# Patient Record
Sex: FEMALE | Race: WHITE | NOT HISPANIC OR LATINO | URBAN - METROPOLITAN AREA
[De-identification: names, ages, dates, MRNs, and addresses within clinical notes are randomized per-mention and may not be internally consistent; named-entity substitution may affect disease eponyms.]

---

## 2021-01-01 ENCOUNTER — OFFICE VISIT (OUTPATIENT)
Dept: URBAN - METROPOLITAN AREA CLINIC 68 | Facility: CLINIC | Age: 71
End: 2021-01-01

## 2021-03-09 ENCOUNTER — OFFICE VISIT (OUTPATIENT)
Dept: URBAN - METROPOLITAN AREA CLINIC 68 | Facility: CLINIC | Age: 71
End: 2021-03-09

## 2021-10-14 NOTE — PATIENT DISCUSSION
Patient informed on CV or Advanced IOL but she would need to have LASIK OD to improve distance vision OD.

## 2021-10-19 NOTE — PATIENT DISCUSSION
If pt choose to [facility] rec. Epi-Sonu PLATA with [facility]. Monitor.

## 2021-10-19 NOTE — PATIENT DISCUSSION
Rec. NV vs DV (CV/B), vs Mono VA OS dist. , vs Adv. BONI OD. IF needed future correction Epi-Lasek tx. The patient feels that the cataract is significantly impacting daily activities and has elected cataract surgery. The risks, benefits, and alternatives to surgery were discussed. The patient elects to proceed with surgery.

## 2021-12-02 NOTE — PATIENT DISCUSSION
If pt choose to [facility] rec. Epi-Lasek BONI with [facility]. Monitor.

## 2022-06-04 ENCOUNTER — TELEPHONE ENCOUNTER (OUTPATIENT)
Dept: URBAN - METROPOLITAN AREA CLINIC 68 | Facility: CLINIC | Age: 72
End: 2022-06-04

## 2022-06-04 RX ORDER — POLYETHYLENE GLYCOL 3350 17 G/DOSE
POWDER (GRAM) ORAL
Qty: 1 | Refills: 0 | OUTPATIENT
Start: 2021-03-09 | End: 2021-04-08

## 2022-06-05 ENCOUNTER — TELEPHONE ENCOUNTER (OUTPATIENT)
Dept: URBAN - METROPOLITAN AREA CLINIC 68 | Facility: CLINIC | Age: 72
End: 2022-06-05

## 2022-06-05 RX ORDER — LEVOTHYROXINE SODIUM 112 UG/1
SYNTHROID( 112MCG ORAL   ) ACTIVE -HX ENTRY TABLET ORAL
Status: ACTIVE | COMMUNITY
Start: 2021-03-09

## 2022-06-05 RX ORDER — SULFADIAZINE 500 MG/1
SULFADIAZINE( 500MG ORAL   ) ACTIVE -HX ENTRY TABLET ORAL
Status: ACTIVE | COMMUNITY
Start: 2021-03-09

## 2022-06-05 RX ORDER — HYDROXYCHLOROQUINE SULFATE 200 MG/1
PLAQUENIL( 200MG ORAL   ) ACTIVE -HX ENTRY TABLET ORAL
Status: ACTIVE | COMMUNITY
Start: 2021-03-09

## 2022-06-05 RX ORDER — LIOTHYRONINE SODIUM 5 UG/1
CYTOMEL( 5MCG ORAL   ) ACTIVE -HX ENTRY TABLET ORAL
Status: ACTIVE | COMMUNITY
Start: 2021-03-09

## 2022-06-25 ENCOUNTER — TELEPHONE ENCOUNTER (OUTPATIENT)
Age: 72
End: 2022-06-25

## 2022-06-25 RX ORDER — LORATADINE 5 MG
TABLET,CHEWABLE ORAL
Qty: 1 | Refills: 0 | OUTPATIENT
Start: 2021-03-09 | End: 2021-04-08

## 2022-06-26 ENCOUNTER — TELEPHONE ENCOUNTER (OUTPATIENT)
Age: 72
End: 2022-06-26

## 2022-06-26 RX ORDER — SULFADIAZINE 500 MG/1
SULFADIAZINE( 500MG ORAL   ) ACTIVE -HX ENTRY TABLET ORAL
Status: ACTIVE | COMMUNITY
Start: 2021-03-09

## 2022-06-26 RX ORDER — LEVOTHYROXINE SODIUM 112 MCG
SYNTHROID( 112MCG ORAL   ) ACTIVE -HX ENTRY TABLET ORAL
Status: ACTIVE | COMMUNITY
Start: 2021-03-09

## 2022-06-26 RX ORDER — LIOTHYRONINE SODIUM 5 UG/1
CYTOMEL( 5MCG ORAL   ) ACTIVE -HX ENTRY TABLET ORAL
Status: ACTIVE | COMMUNITY
Start: 2021-03-09

## 2022-06-26 RX ORDER — HYDROXYCHLOROQUINE SULFATE 200 MG/1
PLAQUENIL( 200MG ORAL   ) ACTIVE -HX ENTRY TABLET ORAL
Status: ACTIVE | COMMUNITY
Start: 2021-03-09

## 2022-12-13 ENCOUNTER — COMPREHENSIVE EXAM (OUTPATIENT)
Dept: URBAN - METROPOLITAN AREA CLINIC 32 | Facility: CLINIC | Age: 72
End: 2022-12-13

## 2022-12-13 PROCEDURE — 92134 CPTRZ OPH DX IMG PST SGM RTA: CPT

## 2022-12-13 PROCEDURE — 92014 COMPRE OPH EXAM EST PT 1/>: CPT

## 2022-12-13 PROCEDURE — 92015 DETERMINE REFRACTIVE STATE: CPT

## 2022-12-13 ASSESSMENT — VISUAL ACUITY
OS_CC: J1
OS_SC: 20/60
OS_SC: J5
OD_PH: 20/60
OD_SC: J5
OD_CC: J3
OS_GLARE: 20/70
OD_SC: 20/100
OD_CC: 20/80-2
OS_PH: 20/30
OS_CC: 20/50+2
OD_GLARE: 20/300

## 2022-12-13 ASSESSMENT — TONOMETRY
OD_IOP_MMHG: 19
OS_IOP_MMHG: 20

## 2024-05-08 ENCOUNTER — COMPREHENSIVE EXAM (OUTPATIENT)
Dept: URBAN - METROPOLITAN AREA CLINIC 32 | Facility: CLINIC | Age: 74
End: 2024-05-08

## 2024-05-08 DIAGNOSIS — H35.371: ICD-10-CM

## 2024-05-08 DIAGNOSIS — Z79.61: ICD-10-CM

## 2024-05-08 DIAGNOSIS — H25.13: ICD-10-CM

## 2024-05-08 DIAGNOSIS — H11.042: ICD-10-CM

## 2024-05-08 DIAGNOSIS — H16.223: ICD-10-CM

## 2024-05-08 PROCEDURE — 99214 OFFICE O/P EST MOD 30 MIN: CPT

## 2024-05-08 PROCEDURE — 92134 CPTRZ OPH DX IMG PST SGM RTA: CPT

## 2024-05-08 ASSESSMENT — VISUAL ACUITY
OS_CC: 20/40-1
OS_GLARE: 20/50
OS_SC: 20/80
OD_CC: J2
OD_SC: J7
OS_SC: J7
OD_GLARE: 20/100
OD_SC: 20/100
OD_CC: 20/80-1
OS_CC: J2

## 2024-05-08 ASSESSMENT — TONOMETRY
OD_IOP_MMHG: 16
OS_IOP_MMHG: 19

## 2024-05-29 ENCOUNTER — DASHBOARD ENCOUNTERS (OUTPATIENT)
Age: 74
End: 2024-05-29

## 2024-06-03 ENCOUNTER — OFFICE VISIT (OUTPATIENT)
Dept: URBAN - METROPOLITAN AREA CLINIC 68 | Facility: CLINIC | Age: 74
End: 2024-06-03

## 2024-06-12 ENCOUNTER — OFFICE VISIT (OUTPATIENT)
Dept: URBAN - METROPOLITAN AREA CLINIC 68 | Facility: CLINIC | Age: 74
End: 2024-06-12
Payer: MEDICARE

## 2024-06-12 VITALS
DIASTOLIC BLOOD PRESSURE: 60 MMHG | HEIGHT: 61 IN | WEIGHT: 102 LBS | SYSTOLIC BLOOD PRESSURE: 100 MMHG | BODY MASS INDEX: 19.26 KG/M2

## 2024-06-12 DIAGNOSIS — K59.09 CHRONIC CONSTIPATION: ICD-10-CM

## 2024-06-12 PROCEDURE — 99204 OFFICE O/P NEW MOD 45 MIN: CPT | Performed by: INTERNAL MEDICINE

## 2024-06-12 RX ORDER — DOXYCYCLINE 100 MG/1
TAKE ONE CAPSULE BY MOUTH TWICE A DAY FOR 7 DAYS CAPSULE ORAL
Qty: 14 UNSPECIFIED | Refills: 0 | Status: ACTIVE | COMMUNITY

## 2024-06-12 RX ORDER — ATORVASTATIN CALCIUM 10 MG/1
TAKE ONE TABLET BY MOUTH ONE TIME DAILY IN THE EVENING TABLET, FILM COATED ORAL
Qty: 90 UNSPECIFIED | Refills: 0 | Status: ACTIVE | COMMUNITY

## 2024-06-12 RX ORDER — LEVOTHYROXINE SODIUM 175 UG/1
TABLET ORAL
Qty: 60 TABLET | Status: ACTIVE | COMMUNITY

## 2024-06-12 RX ORDER — LIOTHYRONINE SODIUM 5 UG/1
TAKE ONE TABLET BY MOUTH EVERY MORNING TABLET ORAL
Qty: 90 UNSPECIFIED | Refills: 2 | Status: ACTIVE | COMMUNITY

## 2024-06-12 RX ORDER — BETAMETHASONE DIPROPIONATE 0.5 MG/G
APPLY TO THE AFFECTED AREA(S) OF BACK TOPICALLY TWICE DAILY FOR 4 WEEKS CREAM TOPICAL
Qty: 45 UNSPECIFIED | Refills: 0 | Status: ACTIVE | COMMUNITY

## 2024-06-12 RX ORDER — HYDROXYCHLOROQUINE SULFATE 200 MG/1
TAKE ONE TABLET BY MOUTH ONE TIME DAILY TABLET, FILM COATED ORAL
Qty: 90 UNSPECIFIED | Refills: 0 | Status: ACTIVE | COMMUNITY

## 2024-06-12 RX ORDER — EZETIMIBE 10 MG/1
TAKE ONE TABLET BY MOUTH ONE TIME DAILY TABLET ORAL
Qty: 90 UNSPECIFIED | Refills: 0 | Status: ACTIVE | COMMUNITY

## 2024-06-12 RX ORDER — LIOTHYRONINE SODIUM 5 UG/1
CYTOMEL( 5MCG ORAL   ) ACTIVE -HX ENTRY TABLET ORAL
Status: ACTIVE | COMMUNITY
Start: 2021-03-09

## 2024-06-12 RX ORDER — SULFADIAZINE 500 MG/1
SULFADIAZINE( 500MG ORAL   ) ACTIVE -HX ENTRY TABLET ORAL
Status: ACTIVE | COMMUNITY
Start: 2021-03-09

## 2024-06-12 RX ORDER — BUSPIRONE HYDROCHLORIDE 5 MG/1
TABLET ORAL
Qty: 60 TABLET | Status: ACTIVE | COMMUNITY

## 2024-06-12 NOTE — HPI-TODAY'S VISIT:
Case of a 74 YOF that comes in today for evaluation of constipation. He has longstanding constipation. She uses multiple OTC laxative. Currently she is using a OTC fiber supplement and this is helping. She recently underwent MRI-D and Sitz marker test she has not gotten back the results. She denied episodes of neither bright red blood per rectum (hematochezia) nor black/tarry stools (melena). She also denied any unexplained significant weight loss, frequent abdominal pain/distention, nausea, vomits, early satiety, tenesmus, rectal pain and changes in bowel habits or stool caliber. She did not complain of episodes of diarrhea interspersed with constipation.

## 2024-06-25 ENCOUNTER — TELEPHONE ENCOUNTER (OUTPATIENT)
Dept: URBAN - METROPOLITAN AREA CLINIC 68 | Facility: CLINIC | Age: 74
End: 2024-06-25

## 2024-06-28 ENCOUNTER — OFFICE VISIT (OUTPATIENT)
Dept: URBAN - METROPOLITAN AREA CLINIC 68 | Facility: CLINIC | Age: 74
End: 2024-06-28
Payer: MEDICARE

## 2024-06-28 VITALS — HEIGHT: 61 IN | WEIGHT: 102 LBS | BODY MASS INDEX: 19.26 KG/M2

## 2024-06-28 DIAGNOSIS — K59.09 CHRONIC CONSTIPATION: ICD-10-CM

## 2024-06-28 PROCEDURE — 99214 OFFICE O/P EST MOD 30 MIN: CPT | Performed by: INTERNAL MEDICINE

## 2024-06-28 RX ORDER — DOXYCYCLINE 100 MG/1
TAKE ONE CAPSULE BY MOUTH TWICE A DAY FOR 7 DAYS CAPSULE ORAL
Qty: 14 UNSPECIFIED | Refills: 0 | Status: ACTIVE | COMMUNITY

## 2024-06-28 RX ORDER — HYDROXYCHLOROQUINE SULFATE 200 MG/1
TAKE ONE TABLET BY MOUTH ONE TIME DAILY TABLET, FILM COATED ORAL
Qty: 90 UNSPECIFIED | Refills: 0 | Status: ACTIVE | COMMUNITY

## 2024-06-28 RX ORDER — LIOTHYRONINE SODIUM 5 UG/1
CYTOMEL( 5MCG ORAL   ) ACTIVE -HX ENTRY TABLET ORAL
Status: ACTIVE | COMMUNITY
Start: 2021-03-09

## 2024-06-28 RX ORDER — EZETIMIBE 10 MG/1
TAKE ONE TABLET BY MOUTH ONE TIME DAILY TABLET ORAL
Qty: 90 UNSPECIFIED | Refills: 0 | Status: ACTIVE | COMMUNITY

## 2024-06-28 RX ORDER — BETAMETHASONE DIPROPIONATE 0.5 MG/G
APPLY TO THE AFFECTED AREA(S) OF BACK TOPICALLY TWICE DAILY FOR 4 WEEKS CREAM TOPICAL
Qty: 45 UNSPECIFIED | Refills: 0 | Status: ACTIVE | COMMUNITY

## 2024-06-28 RX ORDER — BUSPIRONE HYDROCHLORIDE 5 MG/1
TABLET ORAL
Qty: 60 TABLET | Status: ACTIVE | COMMUNITY

## 2024-06-28 RX ORDER — LEVOTHYROXINE SODIUM 175 UG/1
TABLET ORAL
Qty: 60 TABLET | Status: ACTIVE | COMMUNITY

## 2024-06-28 RX ORDER — ATORVASTATIN CALCIUM 10 MG/1
TAKE ONE TABLET BY MOUTH ONE TIME DAILY IN THE EVENING TABLET, FILM COATED ORAL
Qty: 90 UNSPECIFIED | Refills: 0 | Status: ACTIVE | COMMUNITY

## 2024-06-28 RX ORDER — SULFADIAZINE 500 MG/1
SULFADIAZINE( 500MG ORAL   ) ACTIVE -HX ENTRY TABLET ORAL
Status: ACTIVE | COMMUNITY
Start: 2021-03-09

## 2024-06-28 RX ORDER — LIOTHYRONINE SODIUM 5 UG/1
TAKE ONE TABLET BY MOUTH EVERY MORNING TABLET ORAL
Qty: 90 UNSPECIFIED | Refills: 2 | Status: ACTIVE | COMMUNITY

## 2024-06-28 NOTE — HPI-TODAY'S VISIT:
Case of a 74 YOF that comes in today for evaluation of constipation. He has longstanding constipation. She uses multiple OTC laxative. Currently she is using a OTC fiber supplement and this is helping. She recently underwent MRI-D and Sitz marker test she has not gotten back the results. On the last appointment she was doing better on fiber supplementation and conservative management. She denied episodes of neither bright red blood per rectum (hematochezia) nor black/tarry stools (melena). She also denied any unexplained significant weight loss, frequent abdominal pain/distention, nausea, vomits, early satiety, tenesmus, rectal pain and changes in bowel habits or stool caliber. She refers today that her constipation is under controlled with current management.

## 2024-08-13 ENCOUNTER — OFFICE VISIT (OUTPATIENT)
Dept: URBAN - METROPOLITAN AREA CLINIC 68 | Facility: CLINIC | Age: 74
End: 2024-08-13

## 2024-08-13 RX ORDER — HYDROXYCHLOROQUINE SULFATE 200 MG/1
TAKE ONE TABLET BY MOUTH ONE TIME DAILY TABLET, FILM COATED ORAL
Qty: 90 UNSPECIFIED | Refills: 0 | Status: ACTIVE | COMMUNITY

## 2024-08-13 RX ORDER — ATORVASTATIN CALCIUM 10 MG/1
TAKE ONE TABLET BY MOUTH ONE TIME DAILY IN THE EVENING TABLET, FILM COATED ORAL
Qty: 90 UNSPECIFIED | Refills: 0 | Status: ACTIVE | COMMUNITY

## 2024-08-13 RX ORDER — BUSPIRONE HYDROCHLORIDE 5 MG/1
TABLET ORAL
Qty: 60 TABLET | Status: ACTIVE | COMMUNITY

## 2024-08-13 RX ORDER — EZETIMIBE 10 MG/1
TAKE ONE TABLET BY MOUTH ONE TIME DAILY TABLET ORAL
Qty: 90 UNSPECIFIED | Refills: 0 | Status: ACTIVE | COMMUNITY

## 2024-08-13 RX ORDER — LIOTHYRONINE SODIUM 5 UG/1
CYTOMEL( 5MCG ORAL   ) ACTIVE -HX ENTRY TABLET ORAL
Status: ACTIVE | COMMUNITY
Start: 2021-03-09

## 2024-08-13 RX ORDER — LEVOTHYROXINE SODIUM 175 UG/1
TABLET ORAL
Qty: 60 TABLET | Status: ACTIVE | COMMUNITY

## 2024-08-13 RX ORDER — BETAMETHASONE DIPROPIONATE 0.5 MG/G
APPLY TO THE AFFECTED AREA(S) OF BACK TOPICALLY TWICE DAILY FOR 4 WEEKS CREAM TOPICAL
Qty: 45 UNSPECIFIED | Refills: 0 | Status: ACTIVE | COMMUNITY

## 2024-08-13 RX ORDER — LIOTHYRONINE SODIUM 5 UG/1
TAKE ONE TABLET BY MOUTH EVERY MORNING TABLET ORAL
Qty: 90 UNSPECIFIED | Refills: 2 | Status: ACTIVE | COMMUNITY

## 2024-08-13 RX ORDER — SULFADIAZINE 500 MG/1
SULFADIAZINE( 500MG ORAL   ) ACTIVE -HX ENTRY TABLET ORAL
Status: ACTIVE | COMMUNITY
Start: 2021-03-09

## 2024-08-13 RX ORDER — DOXYCYCLINE 100 MG/1
TAKE ONE CAPSULE BY MOUTH TWICE A DAY FOR 7 DAYS CAPSULE ORAL
Qty: 14 UNSPECIFIED | Refills: 0 | Status: ACTIVE | COMMUNITY

## 2024-08-13 NOTE — HPI-TODAY'S VISIT:
Case of a 74 YOF that comes in today for follow up of constipation. She has hx of  longstanding constipation. She uses multiple OTC laxative. Currently she is using a OTC fiber supplement and this is helping. She recently underwent MRI-D and Sitz marker test results detailed below. On the last appointment she was doing better on fiber supplementation and conservative management. Linzess was added to her bowel rimen. She comes in today  for follow up.   She denied episodes of neither bright red blood per rectum (hematochezia) nor black/tarry stools (melena). She also denied any unexplained significant weight loss, frequent abdominal pain/distention, nausea, vomits, early satiety, tenesmus, rectal pain and changes in bowel habits or stool caliber.

## 2024-09-10 ENCOUNTER — OFFICE VISIT (OUTPATIENT)
Dept: URBAN - METROPOLITAN AREA CLINIC 68 | Facility: CLINIC | Age: 74
End: 2024-09-10
Payer: MEDICARE

## 2024-09-10 VITALS
BODY MASS INDEX: 19.26 KG/M2 | HEIGHT: 61 IN | WEIGHT: 102 LBS | SYSTOLIC BLOOD PRESSURE: 102 MMHG | DIASTOLIC BLOOD PRESSURE: 62 MMHG | HEART RATE: 65 BPM | OXYGEN SATURATION: 99 %

## 2024-09-10 DIAGNOSIS — K59.09 CHRONIC CONSTIPATION: ICD-10-CM

## 2024-09-10 DIAGNOSIS — R15.1 FECAL SMEARING: ICD-10-CM

## 2024-09-10 PROBLEM — 225593006: Status: ACTIVE | Noted: 2024-09-10

## 2024-09-10 PROCEDURE — 99214 OFFICE O/P EST MOD 30 MIN: CPT | Performed by: INTERNAL MEDICINE

## 2024-09-10 RX ORDER — SULFADIAZINE 500 MG/1
SULFADIAZINE( 500MG ORAL   ) ACTIVE -HX ENTRY TABLET ORAL
Status: ACTIVE | COMMUNITY
Start: 2021-03-09

## 2024-09-10 RX ORDER — BETAMETHASONE DIPROPIONATE 0.5 MG/G
APPLY TO THE AFFECTED AREA(S) OF BACK TOPICALLY TWICE DAILY FOR 4 WEEKS CREAM TOPICAL
Qty: 45 UNSPECIFIED | Refills: 0 | Status: ACTIVE | COMMUNITY

## 2024-09-10 RX ORDER — LIOTHYRONINE SODIUM 5 UG/1
TAKE ONE TABLET BY MOUTH EVERY MORNING TABLET ORAL
Qty: 90 UNSPECIFIED | Refills: 2 | Status: ACTIVE | COMMUNITY

## 2024-09-10 RX ORDER — LIOTHYRONINE SODIUM 5 UG/1
CYTOMEL( 5MCG ORAL   ) ACTIVE -HX ENTRY TABLET ORAL
Status: ACTIVE | COMMUNITY
Start: 2021-03-09

## 2024-09-10 RX ORDER — HYDROXYCHLOROQUINE SULFATE 200 MG/1
TAKE ONE TABLET BY MOUTH ONE TIME DAILY TABLET, FILM COATED ORAL
Qty: 90 UNSPECIFIED | Refills: 0 | Status: ACTIVE | COMMUNITY

## 2024-09-10 RX ORDER — EZETIMIBE 10 MG/1
TAKE ONE TABLET BY MOUTH ONE TIME DAILY TABLET ORAL
Qty: 90 UNSPECIFIED | Refills: 0 | Status: ACTIVE | COMMUNITY

## 2024-09-10 RX ORDER — LEVOTHYROXINE SODIUM 175 UG/1
TABLET ORAL
Qty: 60 TABLET | Status: ACTIVE | COMMUNITY

## 2024-09-10 RX ORDER — BUSPIRONE HYDROCHLORIDE 5 MG/1
TABLET ORAL
Qty: 60 TABLET | Status: ACTIVE | COMMUNITY

## 2024-09-10 RX ORDER — ATORVASTATIN CALCIUM 10 MG/1
TAKE ONE TABLET BY MOUTH ONE TIME DAILY IN THE EVENING TABLET, FILM COATED ORAL
Qty: 90 UNSPECIFIED | Refills: 0 | Status: ACTIVE | COMMUNITY

## 2024-09-10 RX ORDER — DOXYCYCLINE 100 MG/1
TAKE ONE CAPSULE BY MOUTH TWICE A DAY FOR 7 DAYS CAPSULE ORAL
Qty: 14 UNSPECIFIED | Refills: 0 | Status: ACTIVE | COMMUNITY

## 2024-09-10 NOTE — HPI-TODAY'S VISIT:
Case of a 74 YOF that comes in today for follow up of constipation. She has hx of  longstanding constipation. She uses multiple OTC laxative.  She recently underwent MRI-D and Sitz marker test results detailed below. She comes in today  for follow up. She refers her constipation is doing better on high-fiber diet.  She still has episodes of fecal smearing.  She cleans herself diligently after bowel movements.  Nevertheless sometimes she feels that she needs to clean herself again.  She has had episodes of fecal soiling in her underwear as well.This has been going on for more than 6 months. She denied episodes of neither bright red blood per rectum (hematochezia) nor black/tarry stools (melena). She also denied any unexplained significant weight loss, frequent abdominal pain/distention, nausea, vomits, early satiety, tenesmus, rectal pain and changes in bowel habits or stool caliber.

## 2024-09-24 ENCOUNTER — OFFICE VISIT (OUTPATIENT)
Dept: URBAN - METROPOLITAN AREA CLINIC 68 | Facility: CLINIC | Age: 74
End: 2024-09-24

## 2024-10-01 ENCOUNTER — OFFICE VISIT (OUTPATIENT)
Dept: URBAN - METROPOLITAN AREA CLINIC 68 | Facility: CLINIC | Age: 74
End: 2024-10-01
Payer: MEDICARE

## 2024-10-01 VITALS
DIASTOLIC BLOOD PRESSURE: 82 MMHG | SYSTOLIC BLOOD PRESSURE: 122 MMHG | HEIGHT: 61 IN | BODY MASS INDEX: 19.26 KG/M2 | WEIGHT: 102 LBS

## 2024-10-01 DIAGNOSIS — K59.09 CHRONIC CONSTIPATION: ICD-10-CM

## 2024-10-01 DIAGNOSIS — R15.1 FECAL SMEARING: ICD-10-CM

## 2024-10-01 PROCEDURE — 99214 OFFICE O/P EST MOD 30 MIN: CPT | Performed by: INTERNAL MEDICINE

## 2024-10-01 RX ORDER — SULFADIAZINE 500 MG/1
SULFADIAZINE( 500MG ORAL   ) ACTIVE -HX ENTRY TABLET ORAL
Status: ACTIVE | COMMUNITY
Start: 2021-03-09

## 2024-10-01 RX ORDER — BUSPIRONE HYDROCHLORIDE 5 MG/1
TABLET ORAL
Qty: 60 TABLET | Status: ACTIVE | COMMUNITY

## 2024-10-01 RX ORDER — DOXYCYCLINE 100 MG/1
TAKE ONE CAPSULE BY MOUTH TWICE A DAY FOR 7 DAYS CAPSULE ORAL
Qty: 14 UNSPECIFIED | Refills: 0 | Status: ACTIVE | COMMUNITY

## 2024-10-01 RX ORDER — BETAMETHASONE DIPROPIONATE 0.5 MG/G
APPLY TO THE AFFECTED AREA(S) OF BACK TOPICALLY TWICE DAILY FOR 4 WEEKS CREAM TOPICAL
Qty: 45 UNSPECIFIED | Refills: 0 | Status: ACTIVE | COMMUNITY

## 2024-10-01 RX ORDER — ATORVASTATIN CALCIUM 10 MG/1
TAKE ONE TABLET BY MOUTH ONE TIME DAILY IN THE EVENING TABLET, FILM COATED ORAL
Qty: 90 UNSPECIFIED | Refills: 0 | Status: ACTIVE | COMMUNITY

## 2024-10-01 RX ORDER — EZETIMIBE 10 MG/1
TAKE ONE TABLET BY MOUTH ONE TIME DAILY TABLET ORAL
Qty: 90 UNSPECIFIED | Refills: 0 | Status: ACTIVE | COMMUNITY

## 2024-10-01 RX ORDER — HYDROXYCHLOROQUINE SULFATE 200 MG/1
TAKE ONE TABLET BY MOUTH ONE TIME DAILY TABLET, FILM COATED ORAL
Qty: 90 UNSPECIFIED | Refills: 0 | Status: ACTIVE | COMMUNITY

## 2024-10-01 RX ORDER — LIOTHYRONINE SODIUM 5 UG/1
CYTOMEL( 5MCG ORAL   ) ACTIVE -HX ENTRY TABLET ORAL
Status: ACTIVE | COMMUNITY
Start: 2021-03-09

## 2024-10-01 RX ORDER — LEVOTHYROXINE SODIUM 175 UG/1
TABLET ORAL
Qty: 60 TABLET | Status: ACTIVE | COMMUNITY

## 2024-10-01 RX ORDER — LIOTHYRONINE SODIUM 5 UG/1
TAKE ONE TABLET BY MOUTH EVERY MORNING TABLET ORAL
Qty: 90 UNSPECIFIED | Refills: 2 | Status: ACTIVE | COMMUNITY

## 2024-10-01 NOTE — HPI-TODAY'S VISIT:
Case of a 74 YOF that comes in today for follow up of constipation. She has hx of  longstanding constipation. She uses multiple OTC laxative.  She recently underwent MRI-D and Sitz marker test results detailed below. She comes in today  for follow up. She refers her constipation is doing better on high-fiber diet.  She still has episodes of fecal smearing.  She cleans herself diligently after bowel movements.  Nevertheless sometimes she feels that she needs to clean herself again.  She has had episodes of fecal soiling in her underwear as well.This has been going on for more than 6 months. On the last appointment she was started on fiber supplementation. She comes in today for follow up. She is taking psyllium 1 tbps daily. She is having daily to bid BM. She still having some fecal smearing and some fecal incontenence, She denied episodes of neither bright red blood per rectum (hematochezia) nor black/tarry stools (melena). She also denied any unexplained significant weight loss, frequent abdominal pain/distention, nausea, vomits, early satiety, tenesmus, rectal pain and changes in bowel habits or stool caliber.

## 2024-11-26 ENCOUNTER — COMPREHENSIVE EXAM (OUTPATIENT)
Dept: URBAN - METROPOLITAN AREA CLINIC 32 | Facility: CLINIC | Age: 74
End: 2024-11-26

## 2024-11-26 DIAGNOSIS — H25.13: ICD-10-CM

## 2024-11-26 DIAGNOSIS — Z79.899: ICD-10-CM

## 2024-11-26 DIAGNOSIS — H11.042: ICD-10-CM

## 2024-11-26 DIAGNOSIS — M06.9: ICD-10-CM

## 2024-11-26 DIAGNOSIS — M32.10: ICD-10-CM

## 2024-11-26 DIAGNOSIS — H35.371: ICD-10-CM

## 2024-11-26 DIAGNOSIS — H16.223: ICD-10-CM

## 2024-11-26 PROCEDURE — 92250 FUNDUS PHOTOGRAPHY W/I&R: CPT

## 2024-11-26 PROCEDURE — 99214 OFFICE O/P EST MOD 30 MIN: CPT

## 2024-12-11 ENCOUNTER — TELEPHONE ENCOUNTER (OUTPATIENT)
Dept: URBAN - METROPOLITAN AREA CLINIC 68 | Facility: CLINIC | Age: 74
End: 2024-12-11

## 2025-01-08 ENCOUNTER — FOLLOW UP (OUTPATIENT)
Age: 75
End: 2025-01-08

## 2025-01-08 DIAGNOSIS — H35.341: ICD-10-CM

## 2025-01-08 DIAGNOSIS — H35.371: ICD-10-CM

## 2025-01-08 DIAGNOSIS — H25.13: ICD-10-CM

## 2025-01-08 DIAGNOSIS — Z79.899: ICD-10-CM

## 2025-01-08 DIAGNOSIS — M06.9: ICD-10-CM

## 2025-01-08 PROCEDURE — 92134 CPTRZ OPH DX IMG PST SGM RTA: CPT

## 2025-01-08 PROCEDURE — 99213 OFFICE O/P EST LOW 20 MIN: CPT

## 2025-01-08 PROCEDURE — 92083 EXTENDED VISUAL FIELD XM: CPT

## 2025-01-28 ENCOUNTER — LAB OUTSIDE AN ENCOUNTER (OUTPATIENT)
Dept: URBAN - METROPOLITAN AREA CLINIC 68 | Facility: CLINIC | Age: 75
End: 2025-01-28

## 2025-01-28 ENCOUNTER — OFFICE VISIT (OUTPATIENT)
Dept: URBAN - METROPOLITAN AREA CLINIC 68 | Facility: CLINIC | Age: 75
End: 2025-01-28
Payer: MEDICARE

## 2025-01-28 VITALS
BODY MASS INDEX: 19.26 KG/M2 | HEIGHT: 61 IN | DIASTOLIC BLOOD PRESSURE: 70 MMHG | WEIGHT: 102 LBS | SYSTOLIC BLOOD PRESSURE: 110 MMHG

## 2025-01-28 DIAGNOSIS — R19.7 INTERMITTENT DIARRHEA: ICD-10-CM

## 2025-01-28 DIAGNOSIS — K59.09 CHRONIC CONSTIPATION: ICD-10-CM

## 2025-01-28 DIAGNOSIS — R15.1 FECAL SMEARING: ICD-10-CM

## 2025-01-28 PROBLEM — 62315008: Status: ACTIVE | Noted: 2025-01-28

## 2025-01-28 PROBLEM — 249517009: Status: ACTIVE | Noted: 2025-01-28

## 2025-01-28 PROCEDURE — 99214 OFFICE O/P EST MOD 30 MIN: CPT | Performed by: INTERNAL MEDICINE

## 2025-01-28 RX ORDER — EZETIMIBE 10 MG/1
TAKE ONE TABLET BY MOUTH ONE TIME DAILY TABLET ORAL
Qty: 90 UNSPECIFIED | Refills: 0 | Status: ACTIVE | COMMUNITY

## 2025-01-28 RX ORDER — BUSPIRONE HYDROCHLORIDE 5 MG/1
TABLET ORAL
Qty: 60 TABLET | Status: ACTIVE | COMMUNITY

## 2025-01-28 RX ORDER — HYDROXYCHLOROQUINE SULFATE 200 MG/1
TAKE ONE TABLET BY MOUTH ONE TIME DAILY TABLET, FILM COATED ORAL
Qty: 90 UNSPECIFIED | Refills: 0 | Status: ACTIVE | COMMUNITY

## 2025-01-28 RX ORDER — BETAMETHASONE DIPROPIONATE 0.5 MG/G
APPLY TO THE AFFECTED AREA(S) OF BACK TOPICALLY TWICE DAILY FOR 4 WEEKS CREAM TOPICAL
Qty: 45 UNSPECIFIED | Refills: 0 | Status: ACTIVE | COMMUNITY

## 2025-01-28 RX ORDER — DOXYCYCLINE 100 MG/1
TAKE ONE CAPSULE BY MOUTH TWICE A DAY FOR 7 DAYS CAPSULE ORAL
Qty: 14 UNSPECIFIED | Refills: 0 | Status: ACTIVE | COMMUNITY

## 2025-01-28 RX ORDER — ATORVASTATIN CALCIUM 10 MG/1
TAKE ONE TABLET BY MOUTH ONE TIME DAILY IN THE EVENING TABLET, FILM COATED ORAL
Qty: 90 UNSPECIFIED | Refills: 0 | Status: ACTIVE | COMMUNITY

## 2025-01-28 RX ORDER — LEVOTHYROXINE SODIUM 175 UG/1
TABLET ORAL
Qty: 60 TABLET | Status: ACTIVE | COMMUNITY

## 2025-01-28 RX ORDER — LIOTHYRONINE SODIUM 5 UG/1
CYTOMEL( 5MCG ORAL   ) ACTIVE -HX ENTRY TABLET ORAL
Status: ACTIVE | COMMUNITY
Start: 2021-03-09

## 2025-01-28 RX ORDER — LIOTHYRONINE SODIUM 5 UG/1
TAKE ONE TABLET BY MOUTH EVERY MORNING TABLET ORAL
Qty: 90 UNSPECIFIED | Refills: 2 | Status: ACTIVE | COMMUNITY

## 2025-01-28 RX ORDER — SULFADIAZINE 500 MG/1
SULFADIAZINE( 500MG ORAL   ) ACTIVE -HX ENTRY TABLET ORAL
Status: ACTIVE | COMMUNITY
Start: 2021-03-09

## 2025-01-28 NOTE — HPI-TODAY'S VISIT:
Case of a 74 YOF that comes in today for follow up of constipation. She has hx of  longstanding constipation. She uses multiple OTC laxative.  She recently underwent MRI-D and Sitz marker test results detailed below. She was started on fiber supplementation 1 tbps daily psyllium husk. This improve her constipation nevertheless she continued with episodes of fecal smearing.  She cleans herself diligently after bowel movements.  Nevertheless sometimes she feels that she needs to clean herself again.  She has had episodes of fecal soiling in her underwear as well.This has been going on for more than 6 months. She comes in today for follow up. She refers today she has been having bouts of diarrhea.  She continues with fecal soiling on fecal smearing.  She is compliant with psyllium husk but feels that she is not digesting adequately.  She is concerned about possible infection in her colon due to her diarrhea episodes.    She has been unable to partake in pelvic floor rehab. She denied episodes of neither bright red blood per rectum (hematochezia) nor black/tarry stools (melena). She also denied any unexplained significant weight loss, frequent abdominal pain/distention, nausea, vomits, early satiety, tenesmus, rectal pain and changes in bowel habits or stool caliber.

## 2025-02-03 ENCOUNTER — OFFICE VISIT (OUTPATIENT)
Dept: URBAN - METROPOLITAN AREA CLINIC 68 | Facility: CLINIC | Age: 75
End: 2025-02-03

## 2025-02-06 ENCOUNTER — TELEPHONE ENCOUNTER (OUTPATIENT)
Dept: URBAN - METROPOLITAN AREA CLINIC 68 | Facility: CLINIC | Age: 75
End: 2025-02-06

## 2025-02-09 ENCOUNTER — TELEPHONE ENCOUNTER (OUTPATIENT)
Dept: URBAN - METROPOLITAN AREA CLINIC 68 | Facility: CLINIC | Age: 75
End: 2025-02-09

## 2025-04-23 ENCOUNTER — DIAGNOSTICS ONLY (OUTPATIENT)
Age: 75
End: 2025-04-23

## 2025-04-23 DIAGNOSIS — H11.042: ICD-10-CM

## 2025-04-23 DIAGNOSIS — H25.13: ICD-10-CM

## 2025-04-23 PROCEDURE — 92025 CPTRIZED CORNEAL TOPOGRAPHY: CPT

## 2025-04-23 PROCEDURE — 92136 OPHTHALMIC BIOMETRY: CPT

## 2025-04-23 PROCEDURE — 92499PM PRED MOXI

## 2025-04-23 PROCEDURE — 92286 ANT SGM IMG I&R SPECLR MIC: CPT

## 2025-04-30 ENCOUNTER — LAB OUTSIDE AN ENCOUNTER (OUTPATIENT)
Dept: URBAN - METROPOLITAN AREA CLINIC 68 | Facility: CLINIC | Age: 75
End: 2025-04-30

## 2025-04-30 ENCOUNTER — OFFICE VISIT (OUTPATIENT)
Dept: URBAN - METROPOLITAN AREA CLINIC 68 | Facility: CLINIC | Age: 75
End: 2025-04-30

## 2025-04-30 PROBLEM — 12063002: Status: ACTIVE | Noted: 2025-04-30

## 2025-04-30 PROBLEM — 88111009: Status: ACTIVE | Noted: 2025-04-30

## 2025-04-30 RX ORDER — LIOTHYRONINE SODIUM 5 UG/1
CYTOMEL( 5MCG ORAL   ) ACTIVE -HX ENTRY TABLET ORAL
Status: ACTIVE | COMMUNITY
Start: 2021-03-09

## 2025-04-30 RX ORDER — BUSPIRONE HYDROCHLORIDE 5 MG/1
TABLET ORAL
Qty: 60 TABLET | Status: ACTIVE | COMMUNITY

## 2025-04-30 RX ORDER — HYDROXYCHLOROQUINE SULFATE 200 MG/1
TAKE ONE TABLET BY MOUTH ONE TIME DAILY TABLET, FILM COATED ORAL
Qty: 90 UNSPECIFIED | Refills: 0 | Status: ACTIVE | COMMUNITY

## 2025-04-30 RX ORDER — BETAMETHASONE DIPROPIONATE 0.5 MG/G
APPLY TO THE AFFECTED AREA(S) OF BACK TOPICALLY TWICE DAILY FOR 4 WEEKS CREAM TOPICAL
Qty: 45 UNSPECIFIED | Refills: 0 | Status: ACTIVE | COMMUNITY

## 2025-04-30 RX ORDER — SULFADIAZINE 500 MG/1
SULFADIAZINE( 500MG ORAL   ) ACTIVE -HX ENTRY TABLET ORAL
Status: ACTIVE | COMMUNITY
Start: 2021-03-09

## 2025-04-30 RX ORDER — SOD SULF/POT CHLORIDE/MAG SULF 1.479 G
12 TABLETS TABLET ORAL
Qty: 24 | Refills: 0 | OUTPATIENT
Start: 2025-04-30 | End: 2025-05-01

## 2025-04-30 RX ORDER — LIOTHYRONINE SODIUM 5 UG/1
TAKE ONE TABLET BY MOUTH EVERY MORNING TABLET ORAL
Qty: 90 UNSPECIFIED | Refills: 2 | Status: ACTIVE | COMMUNITY

## 2025-04-30 RX ORDER — DOXYCYCLINE 100 MG/1
TAKE ONE CAPSULE BY MOUTH TWICE A DAY FOR 7 DAYS CAPSULE ORAL
Qty: 14 UNSPECIFIED | Refills: 0 | Status: ACTIVE | COMMUNITY

## 2025-04-30 RX ORDER — ATORVASTATIN CALCIUM 10 MG/1
TAKE ONE TABLET BY MOUTH ONE TIME DAILY IN THE EVENING TABLET, FILM COATED ORAL
Qty: 90 UNSPECIFIED | Refills: 0 | Status: ACTIVE | COMMUNITY

## 2025-04-30 RX ORDER — EZETIMIBE 10 MG/1
TAKE ONE TABLET BY MOUTH ONE TIME DAILY TABLET ORAL
Qty: 90 UNSPECIFIED | Refills: 0 | Status: ACTIVE | COMMUNITY

## 2025-04-30 RX ORDER — LEVOTHYROXINE SODIUM 175 UG/1
TABLET ORAL
Qty: 60 TABLET | Status: ACTIVE | COMMUNITY

## 2025-04-30 NOTE — HPI-TODAY'S VISIT:
Case of a 74-year-old female patient that comes in today for follow-up.  Patient refers that for months now she has been having changes in bowel habits.  She has been having bouts of diarrhea interspersed with bouts of constipation.  Furthermore upon further interrogation she does acknowledges episodes of blood-tinged toilet paper upon cleaning herself.  She denies melena hematochezia hematemesis coffee-ground emesis.  She comes in today for follow-up.

## 2025-05-27 ENCOUNTER — OFFICE VISIT (OUTPATIENT)
Dept: URBAN - METROPOLITAN AREA SURGERY CENTER 12 | Facility: SURGERY CENTER | Age: 75
End: 2025-05-27

## 2025-05-28 ENCOUNTER — OFFICE VISIT (OUTPATIENT)
Dept: URBAN - METROPOLITAN AREA SURGERY CENTER 12 | Facility: SURGERY CENTER | Age: 75
End: 2025-05-28

## 2025-06-03 ENCOUNTER — SURGERY/PROCEDURE (OUTPATIENT)
Age: 75
End: 2025-06-03

## 2025-06-03 DIAGNOSIS — H25.12: ICD-10-CM

## 2025-06-03 PROCEDURE — 66984CV REMOVE CATARACT, INSERT LENS, CUSTOM VISION

## 2025-06-04 ENCOUNTER — POST-OP (OUTPATIENT)
Age: 75
End: 2025-06-04

## 2025-06-04 DIAGNOSIS — Z96.1: ICD-10-CM

## 2025-06-04 PROCEDURE — 99024 POSTOP FOLLOW-UP VISIT: CPT

## 2025-06-11 ENCOUNTER — POST-OP (OUTPATIENT)
Age: 75
End: 2025-06-11

## 2025-06-11 DIAGNOSIS — Z96.1: ICD-10-CM

## 2025-06-11 PROCEDURE — 99024 POSTOP FOLLOW-UP VISIT: CPT

## 2025-06-18 ENCOUNTER — SURGERY/PROCEDURE (OUTPATIENT)
Age: 75
End: 2025-06-18

## 2025-06-18 DIAGNOSIS — H25.11: ICD-10-CM

## 2025-06-18 PROCEDURE — 66984CV REMOVE CATARACT, INSERT LENS, CUSTOM VISION: Mod: 79,RT

## 2025-06-19 ENCOUNTER — POST-OP (OUTPATIENT)
Age: 75
End: 2025-06-19

## 2025-06-19 DIAGNOSIS — Z96.1: ICD-10-CM

## 2025-06-19 PROCEDURE — 99024 POSTOP FOLLOW-UP VISIT: CPT

## 2025-06-19 PROCEDURE — 92499PMB5

## 2025-06-26 ENCOUNTER — POST-OP (OUTPATIENT)
Age: 75
End: 2025-06-26

## 2025-06-26 DIAGNOSIS — Z96.1: ICD-10-CM

## 2025-06-26 PROCEDURE — 99024 POSTOP FOLLOW-UP VISIT: CPT

## 2025-07-28 ENCOUNTER — POST-OP (OUTPATIENT)
Age: 75
End: 2025-07-28

## 2025-07-28 DIAGNOSIS — H02.834: ICD-10-CM

## 2025-07-28 DIAGNOSIS — Z96.1: ICD-10-CM

## 2025-07-28 DIAGNOSIS — H02.831: ICD-10-CM

## 2025-07-28 PROCEDURE — 99024 POSTOP FOLLOW-UP VISIT: CPT

## 2025-08-19 ENCOUNTER — OFFICE VISIT (OUTPATIENT)
Dept: URBAN - METROPOLITAN AREA CLINIC 68 | Facility: CLINIC | Age: 75
End: 2025-08-19